# Patient Record
Sex: FEMALE | Race: OTHER | HISPANIC OR LATINO | URBAN - METROPOLITAN AREA
[De-identification: names, ages, dates, MRNs, and addresses within clinical notes are randomized per-mention and may not be internally consistent; named-entity substitution may affect disease eponyms.]

---

## 2021-08-16 ENCOUNTER — EMERGENCY (EMERGENCY)
Facility: HOSPITAL | Age: 26
LOS: 0 days | Discharge: HOME | End: 2021-08-16
Attending: EMERGENCY MEDICINE
Payer: SELF-PAY

## 2021-08-16 VITALS
HEART RATE: 71 BPM | DIASTOLIC BLOOD PRESSURE: 67 MMHG | RESPIRATION RATE: 16 BRPM | TEMPERATURE: 99 F | OXYGEN SATURATION: 100 % | SYSTOLIC BLOOD PRESSURE: 98 MMHG

## 2021-08-16 VITALS — HEIGHT: 60 IN | WEIGHT: 119.93 LBS

## 2021-08-16 DIAGNOSIS — R06.02 SHORTNESS OF BREATH: ICD-10-CM

## 2021-08-16 DIAGNOSIS — R07.9 CHEST PAIN, UNSPECIFIED: ICD-10-CM

## 2021-08-16 DIAGNOSIS — M94.0 CHONDROCOSTAL JUNCTION SYNDROME [TIETZE]: ICD-10-CM

## 2021-08-16 DIAGNOSIS — R07.89 OTHER CHEST PAIN: ICD-10-CM

## 2021-08-16 DIAGNOSIS — R00.2 PALPITATIONS: ICD-10-CM

## 2021-08-16 DIAGNOSIS — D72.829 ELEVATED WHITE BLOOD CELL COUNT, UNSPECIFIED: ICD-10-CM

## 2021-08-16 LAB
ANION GAP SERPL CALC-SCNC: 8 MMOL/L — SIGNIFICANT CHANGE UP (ref 5–17)
BUN SERPL-MCNC: 11 MG/DL — SIGNIFICANT CHANGE UP (ref 7–23)
CALCIUM SERPL-MCNC: 9.3 MG/DL — SIGNIFICANT CHANGE UP (ref 8.5–10.1)
CHLORIDE SERPL-SCNC: 106 MMOL/L — SIGNIFICANT CHANGE UP (ref 96–108)
CO2 SERPL-SCNC: 24 MMOL/L — SIGNIFICANT CHANGE UP (ref 22–31)
CREAT SERPL-MCNC: 0.66 MG/DL — SIGNIFICANT CHANGE UP (ref 0.5–1.3)
GLUCOSE SERPL-MCNC: 98 MG/DL — SIGNIFICANT CHANGE UP (ref 70–99)
HCT VFR BLD CALC: 39.3 % — SIGNIFICANT CHANGE UP (ref 34.5–45)
HGB BLD-MCNC: 13.1 G/DL — SIGNIFICANT CHANGE UP (ref 11.5–15.5)
MCHC RBC-ENTMCNC: 27.4 PG — SIGNIFICANT CHANGE UP (ref 27–34)
MCHC RBC-ENTMCNC: 33.3 GM/DL — SIGNIFICANT CHANGE UP (ref 32–36)
MCV RBC AUTO: 82.2 FL — SIGNIFICANT CHANGE UP (ref 80–100)
PLATELET # BLD AUTO: 370 K/UL — SIGNIFICANT CHANGE UP (ref 150–400)
POTASSIUM SERPL-MCNC: 3.5 MMOL/L — SIGNIFICANT CHANGE UP (ref 3.5–5.3)
POTASSIUM SERPL-SCNC: 3.5 MMOL/L — SIGNIFICANT CHANGE UP (ref 3.5–5.3)
RBC # BLD: 4.78 M/UL — SIGNIFICANT CHANGE UP (ref 3.8–5.2)
RBC # FLD: 13.2 % — SIGNIFICANT CHANGE UP (ref 10.3–14.5)
SODIUM SERPL-SCNC: 138 MMOL/L — SIGNIFICANT CHANGE UP (ref 135–145)
TSH SERPL-MCNC: 1.06 UU/ML — SIGNIFICANT CHANGE UP (ref 0.34–4.82)
WBC # BLD: 18.42 K/UL — HIGH (ref 3.8–10.5)
WBC # FLD AUTO: 18.42 K/UL — HIGH (ref 3.8–10.5)

## 2021-08-16 PROCEDURE — 93010 ELECTROCARDIOGRAM REPORT: CPT

## 2021-08-16 PROCEDURE — 99284 EMERGENCY DEPT VISIT MOD MDM: CPT

## 2021-08-16 PROCEDURE — 71046 X-RAY EXAM CHEST 2 VIEWS: CPT

## 2021-08-16 PROCEDURE — 80048 BASIC METABOLIC PNL TOTAL CA: CPT

## 2021-08-16 PROCEDURE — 84443 ASSAY THYROID STIM HORMONE: CPT

## 2021-08-16 PROCEDURE — 36415 COLL VENOUS BLD VENIPUNCTURE: CPT

## 2021-08-16 PROCEDURE — 85027 COMPLETE CBC AUTOMATED: CPT

## 2021-08-16 PROCEDURE — 71046 X-RAY EXAM CHEST 2 VIEWS: CPT | Mod: 26

## 2021-08-16 PROCEDURE — 96372 THER/PROPH/DIAG INJ SC/IM: CPT

## 2021-08-16 PROCEDURE — 99283 EMERGENCY DEPT VISIT LOW MDM: CPT | Mod: 25

## 2021-08-16 PROCEDURE — 93005 ELECTROCARDIOGRAM TRACING: CPT

## 2021-08-16 RX ORDER — KETOROLAC TROMETHAMINE 30 MG/ML
30 SYRINGE (ML) INJECTION ONCE
Refills: 0 | Status: DISCONTINUED | OUTPATIENT
Start: 2021-08-16 | End: 2021-08-16

## 2021-08-16 NOTE — ED STATDOCS - PROGRESS NOTE DETAILS
26 y/o F with no significant PMHx presents to the ED c/o intermittent episodes of CP with associated hand cramping and trouble breathing.  Reports having such a sever episode previously. Denies use of OCPs. Denies family hx of blood clots. (+) intermittent R calf pain, for a couple months. Denies any recent travel or long car rides. Pt reports during the episode of CP the pt also reports palpitations. PA note: All labwork including elevated WBC and studies reviewed in details with patient. Patient re-examined and re-evaluated. Patient feels much better at this time. ED evaluation, Diagnosis and management discussed with the patient in detail. Workup results discussed with ED attending, [place] to [place] home. Close PMD follow up encouraged, and repeat CBC as outpatient.  Strict ED return instructions discussed in detail and patient given the opportunity to ask any questions about their discharge diagnosis and instructions. Patient verbalized understanding. ~ Daron Goldman PA-C PA: Pt is a 24 y/o F with no significant PMHx who presents to [facility] c/o intermittent episodes of CP with associated hand cramping and trouble breathing. +HX of similar episodes in the past. DENIES use of OCPs. Denies family hx of blood clots. ~Daron Goldman PA-C   Pt seen and evaluated in . Will get labs, CXR. Reassess. ~Daron Goldman PA-C

## 2021-08-16 NOTE — ED STATDOCS - NS ED ROS FT
Gen: No fever, normal appetite  Eyes: No eye irritation or discharge  ENT: No ear pain, congestion, sore throat  Resp: No cough. (+)  trouble breathing  Cardiovascular: (+) chest pain or palpitation  Gastroenteric: No nausea/vomiting, diarrhea, constipation  :  No change in urine output; no dysuria  MS: No joint or muscle pain  Skin: No rashes  Neuro: No headache; no abnormal movements  Remainder negative, except as per the HPI

## 2021-08-16 NOTE — ED STATDOCS - OBJECTIVE STATEMENT
26 y/o F with no significant PMHx presents to the ED c/o intermittent episodes of CP with associated hand cramping and trouble breathing.  Reports having such a sever episode previously. Denies use of OCPs. Denies family hx of blood clots. (+) intermittent R calf pain, for a couple months. Denies any recent travel or long car rides. Pt reports during the episode of CP the pt also reports palpitations.

## 2021-08-16 NOTE — ED STATDOCS - PATIENT PORTAL LINK FT
You can access the FollowMyHealth Patient Portal offered by [facility] by registering at the following website: [email or web address]. By joining [employer]’s FollowMyHealth portal, you will also be able to view your health information using other applications (apps) compatible with our system.

## 2021-08-16 NOTE — ED ADULT NURSE NOTE - CHPI ED NUR SYMPTOMS NEG
no back pain/no chest pain/no dizziness/no fever/no nausea/no shortness of breath/no syncope/no vomiting

## 2021-08-16 NOTE — ED STATDOCS - CLINICAL SUMMARY MEDICAL DECISION MAKING FREE TEXT BOX
pt with intermittent CP with hand tingling, possible anxiety attack. EKG normal. will check thyroid and CBC for hx of anemia vs. hyperthyroidism.  will check screening CXR pt with intermittent CP with hand tingling, possible anxiety attack. EKG normal. will check thyroid and CBC for hx of anemia vs. hyperthyroidism.  will check screening CXR    PA note: All labwork including elevated WBC and studies reviewed in details with patient. Patient re-examined and re-evaluated. Patient feels much better at this time. ED evaluation, Diagnosis and management discussed with the patient in detail. Workup results discussed with ED attending, [place] to [place] home. Close PMD follow up encouraged, and repeat CBC as outpatient.  Strict ED return instructions discussed in detail and patient given the opportunity to ask any questions about their discharge diagnosis and instructions. Patient verbalized understanding. ~ Daron Goldman PA-C

## 2021-08-16 NOTE — ED STATDOCS - NSFOLLOWUPINSTRUCTIONS_ED_ALL_ED_FT
COSTOCHONDRITIS - General Information           Costocondritis    LO QUE NECESITA SABER:    ¿Qué es la costocondritis?La costocondritis es tigist condición que causa dolor en el cartílago que conecta mariela costillas a shields esternón. El cartílago es el tejido j luis y flexible que protege a los huesos.    ¿Qué causa la costocondritis?La causa de costocondritis podría ser desconocida o podría ser causada por tigist de las siguientes:   •Lesión en el pecho:Tigist lesión al pecho podría causar costocondritis.      •Distensión:Las actividades que tensionan la pared torácica podrían provocar costocondritis. [place] incluye cuando tose fuertemente. Las distenciones también pueden ocurrir al practicar deportes que requieren movimientos del brazo de manera repetida, tales anthony remar, levantar pesas y el voleibol.      •Infección:Las infecciones del pulmón o del pecho pueden aumentar shields riesgo de costocondritis.      •Enfermedades inflamatorias:Las enfermedades que pueden provocar inflamación alrededor de mariela articulaciones, anthony artritis reumatoide, aumentan shields riesgo de costocondritis.      ¿Cuáles son los signos y síntomas de costocondritis?La costocondritis causa dolor en la kerri donde shields esternón se une a mariela costillas. El dolor puede ir y venir, y podría empeorar con el tiempo. El dolor puede ser annette o sordo y persistente. Podría tener dolor al tocar shields pecho. El dolor podría propagarse hacia shields espalda, abdomen o en shields brazo. Es probable que el dolor empeore cuando usted se mueve, respira profundamente, empuja o levanta un objeto. El dolor podría dificultar shields habilidad de dormir o realizar mariela actividades normales.    ¿Cómo se diagnostica la costocondritis?Shields médico le preguntará acerca de mariela signos y síntomas. El médico también llevará a cabo un examen físico. Le tocará el pecho y podría  mariela brazos para determinar si esto le causa dolor.    ¿Cómo se trata la costocondritis?Es probable que el dolor de la costocondritis desaparezca sin tratamiento, generalmente dentro de un año. Shields tratamiento depende de la causa de shields costocondritis. Es posible que usted necesite alguno de los siguientes:   •Acetaminofeno:Ofelia medicamento disminuye el dolor. El acetaminofeno puede obtener sin receta médica. Pregunte la cantidad y la frecuencia con que debe tomarlos. Siga las indicaciones. El acetaminofén puede causar daño en el hígado cuando no se nicola de forma correcta.      •Los SHEFALI,anthony el ibuprofeno, ayudan a disminuir la inflamación, el dolor y la fiebre. Ofelia medicamento está disponible con o sin tigist receta médica. Los SHEFALI pueden causar sangrado estomacal o problemas renales en ciertas personas. Si usted esta tomando un anticoágulante,  siempre pregunte si los AINEs son seguros para usted. Siempre abraham la etiqueta de ofelia medicamento y siga las instrucciones. No administre ofelia medicamento a niños menores de 6 meses de timothy sin antes obtener la autorización de shields médico.      ¿Qué puedo hacer para disminuir el dolor causado por costocondritis?  •Descanse:Es posible que necesite descansar y evitar movimientos y actividades dolorosas. No levante objetos, anthony un bolso o mochila, si esto le causa dolor. Evite las actividades anthony el levantamiento de pesas, hasta que el dolor disminuya o desaparezca. Pregúntele a shields médico cuáles actividades son adecuadas para usted mientras se recupera      •Calor:La aplicación de calor facilita la reducción del dolor en algunos pacientes. Aplíquese calor en el área lesionada marcin 20 a 30 minutos cada 2 horas marcin la cantidad de días que le indiquen.      •Hielo:El hielo ayuda a disminuir la inflamación y el dolor. El hielo también puede contribuir a evitar el daño de los tejidos. Use tigist compresa de hielo o ponga hielo triturado en tigist bolsa de plástico. Cúbralo con tigist toalla y colóquelo en el área adolorida por 15 a 20 minutos cada hora, o anthony se le indique.      •Ejercicios de estiramiento:El estiramiento suave podría aliviar mariela síntomas. Párese en tigist refugio y ponga mariela ramone en el roselia de la refugio al nivel de las orejas u hombros. [place] un paso hacia adelante y estire shields pecho suavemente. [place] lo mismo con las ramone a un nivel más alto también.      ¿Cuándo yahir comunicarme con mi médico?  •Tiene fiebre.      •Las áreas dolorosas en shields pecho se liang inflamadas, enrojecidas o se sienten calientes al tacto.      •Usted no puede dormir a causa del dolor.      •Usted tiene preguntas o inquietudes acerca de shields condición o cuidado.      ACUERDOS SOBRE SHIELDS CUIDADO:    Usted tiene el derecho de ayudar a planear shields cuidado. Aprenda todo lo que pueda sobre shields condición y anthony darle tratamiento. Discuta mariela opciones de tratamiento con mariela médicos para decidir el cuidado que usted desea recibir. Usted siempre tiene el derecho de rechazar el tratamiento.                    Log Out.        White blood cell count - General Information           Conteo de células sanguíneas spencer     INFORMACIÓN GENERAL:     ¿Qué es ofelia examen?     Ofelia examen mide la cantidad de glóbulos blancos en la brissa. Es usado para evaluar y manejar trastornos de glóbulos blancos. Los trastornos pueden ser primariamente trastornos de glóbulos blancos, o debido a causas físicas, toxinas, o tigist enfermedad que causa la inflamación o la supresión (producción disminuida) de glóbulos blancos.    ¿Por qué puedo necesitar ofelia examen?     Los exámenes de laboratorio pueden realizarse por muchas razones. Los exámenes se realizan anthony investigación rutinaria de hannah o sospecha de enfermedad o de toxicidad. También pueden ser utilizados para determinar si tigist condición médica ofelia mejorando o empeorando. Los exámenes también podrían usarse para medir la efectividad o fracaso de un medicamento o plan de tratamiento. Pueden ser solicitados por razones médicas o legales. Usted podría necesitar ofelia examen si tiene:   •Ántrax por inhalación  •Ataque cardiaco  •Cáncer de brissa y sistema linfático  •Diabetes con cetoacidosis  •Endurecimiento de la arteria del corazón  •Estado hiperosmolar no cetósico diabético  •Infección del suelo de la boca  •Inflamación del apéndice  •Inflamación intestinal en sacos  •Influenza due to Influenza A virus  •Meningitis bacteriana  •Neumonía adquirida en la comunidad  •Obstrucción intestinal  •Pancreatitis  •Síndrome coronario, annette  •Síndrome de sepsis  •Síndrome maligno neuroléptico  •Síndrome pulmonar por Hantavirus  •Torsion of the ovary and fallopian tube  •Trastorno psicótico    ¿Cómo yahir prepararme para el examen?     Brissa venosa:     Antes de que le tomen la muestra de brissa, informe al técnico laboratorista si es usted alérgico al látex y si está ingiriendo medicamentos que puedan causar sangrado excesivo. También informe si en alguna ocasión presentó náuseas, ligero dolor de kris o debilidad cuando le sacaron brissa.    Evite hacer ejercicio inmediatamente antes del examen.     Infórmele a la persona que hace el examen si usted está menstruando al tiempo del examen.    Brissa de cordón umbilical:     Hay varias maneras de obtener tigist muestra de brissa de cordón umbilical. Pregúntele a shields trabajador de hannah si hay preparación necesaria antes de ofelia examen.     ¿Cómo se hace el examen?     La brissa venosa o del cordón umbilical puede ser coleccionada para ofelia examen.    Brissa venosa:     Cuando se necesita obtener tigist muestra de brissa venosa, generalmente se selecciona tigist vena del brazo. Se puede poner un torniquete (soto larga de caucho) para flaquito la vena. Se limpia la piel sobre la vena y se introduce la aguja. Le pedirán que permanezca muy quieto mientras mariama la muestra. La brissa se puede recolectar en lupe o más tubos y el torniquete puede ser retirado. Cuando se ha obtenido suficiente brissa para la muestra, el técnico laboratorista saca la aguja.    Brissa de cordón umbilical:     Para obtener brissa del cordón umbilical, se utiliza un método percútaneo de muestreo que consiste en introducir tigist aguja a través del abdomen de la embarazada. Se utiliza ultrasonido para guiar la aguja. Cuando hay suficiente brissa recolectada, la aguja es retirada.     Para obtener brissa del cordón umbilical del recién nacido, el médico puede usar tiigst aguja y jeringa mientras el cordón está aún en el recién nacido. Las muestras de brissa también pueden ser recolectadas de la parte del cordón umbilical que ha sido cortada del recién nacido.    Después del nacimiento, no es necesario obtener la muestra de brissa del muñón de cordón umbilical del recién nacido. [place] si el recién nacido tiene insertado un catéter en los vasos del cordón umbilical, la muestra de brissa puede ser recolectada a través de apple catéter.    ¿Qué me sentiré marcin el examen?     El eulogio de molestia que usted puede sentir dependerá de muchos factores, incluyendo shields sensibilidad para el dolor. Comuníquele a la persona que realiza la prueba lo que esté sintiendo. Informe a dicha persona si usted no puede continuar con el procedimiento.    Brissa venosa:     Marcin la nicola de la muestra, usted puede sentir tigist ligera molestia en el sitio donde ésta se obtiene.    Brissa de cordón umbilical:     Existen diferentes maneras de obtener tigist muestra de brissa de cordón. Dependiendo del procedimiento utilizado para la obtención de la muestra, la prueba puede resultar incómoda. Pregunte a shields médico para que le explique lo que se siente marcin esta prueba.    ¿Qué yahir hacer después del examen?     Brissa venosa:     Después de obtener la muestra sanguínea de tigist vena, se coloca un algodón, crista adhesiva o gasa en el área donde fue insertada la aguja. Le pueden solicitar que aplique presión en el área. Evite practicar ejercicio intenso inmediatamente después de la nicola sanguínea. Avise al técnico laboratorista si usted presenta dolor, enrojecimiento, hinchazón o descarga en el sitio de la punción.    Brissa de cordón umbilical:     Dependiendo del procedimiento utilizado para obtener tigist muestra de la brissa del cordón, existen instrucciones especiales que usted debe seguir. Pregunte a shields médico por cualquier instrucción especial que deba seguir en ofelia procedimiento.    ACUERDOS SOBRE SHIELDS CUIDADO:   ted tiene el derecho de participar en la planificación de mariela cuidados. Para ayudar en esta planificación; usted debe informarse acerca de shields estado de hannah y sobre la forma anthony puede tratarse. De esta manera, usted y mariela médicos pueden hablar acerca de mariela opciones y decidir el cuidado que se usará marcin shields tratamiento. Usted siempre tiene el derecho a rechazar shields tratamiento.

## 2021-08-16 NOTE — ED STATDOCS - CARE PLAN
Principal Discharge DX:	Chest wall pain  Secondary Diagnosis:	Costochondritis  Secondary Diagnosis:	Elevated WBC count   1